# Patient Record
Sex: FEMALE | Race: WHITE
[De-identification: names, ages, dates, MRNs, and addresses within clinical notes are randomized per-mention and may not be internally consistent; named-entity substitution may affect disease eponyms.]

---

## 2017-10-02 ENCOUNTER — HOSPITAL ENCOUNTER (OUTPATIENT)
Dept: HOSPITAL 17 - NEPE | Age: 57
Setting detail: OBSERVATION
LOS: 1 days | Discharge: HOME | End: 2017-10-03
Attending: HOSPITALIST | Admitting: HOSPITALIST
Payer: COMMERCIAL

## 2017-10-02 VITALS
SYSTOLIC BLOOD PRESSURE: 127 MMHG | HEART RATE: 70 BPM | RESPIRATION RATE: 18 BRPM | DIASTOLIC BLOOD PRESSURE: 58 MMHG | OXYGEN SATURATION: 97 %

## 2017-10-02 VITALS — BODY MASS INDEX: 33.87 KG/M2 | HEIGHT: 64 IN | WEIGHT: 198.42 LBS

## 2017-10-02 VITALS
SYSTOLIC BLOOD PRESSURE: 136 MMHG | OXYGEN SATURATION: 99 % | RESPIRATION RATE: 18 BRPM | HEART RATE: 73 BPM | DIASTOLIC BLOOD PRESSURE: 65 MMHG

## 2017-10-02 VITALS
HEART RATE: 73 BPM | TEMPERATURE: 98.1 F | DIASTOLIC BLOOD PRESSURE: 57 MMHG | OXYGEN SATURATION: 97 % | RESPIRATION RATE: 18 BRPM | SYSTOLIC BLOOD PRESSURE: 122 MMHG

## 2017-10-02 VITALS
DIASTOLIC BLOOD PRESSURE: 65 MMHG | TEMPERATURE: 98.8 F | RESPIRATION RATE: 24 BRPM | SYSTOLIC BLOOD PRESSURE: 136 MMHG | OXYGEN SATURATION: 98 % | HEART RATE: 90 BPM

## 2017-10-02 VITALS — DIASTOLIC BLOOD PRESSURE: 62 MMHG | SYSTOLIC BLOOD PRESSURE: 130 MMHG

## 2017-10-02 VITALS — HEART RATE: 75 BPM

## 2017-10-02 VITALS — HEART RATE: 69 BPM

## 2017-10-02 DIAGNOSIS — I47.1: ICD-10-CM

## 2017-10-02 DIAGNOSIS — E66.9: ICD-10-CM

## 2017-10-02 DIAGNOSIS — R00.2: ICD-10-CM

## 2017-10-02 DIAGNOSIS — Z79.899: ICD-10-CM

## 2017-10-02 DIAGNOSIS — Z98.84: ICD-10-CM

## 2017-10-02 DIAGNOSIS — J45.909: ICD-10-CM

## 2017-10-02 DIAGNOSIS — R06.02: ICD-10-CM

## 2017-10-02 DIAGNOSIS — I10: ICD-10-CM

## 2017-10-02 DIAGNOSIS — E03.9: ICD-10-CM

## 2017-10-02 DIAGNOSIS — E11.9: ICD-10-CM

## 2017-10-02 DIAGNOSIS — M19.90: ICD-10-CM

## 2017-10-02 DIAGNOSIS — I48.91: Primary | ICD-10-CM

## 2017-10-02 DIAGNOSIS — R07.89: ICD-10-CM

## 2017-10-02 DIAGNOSIS — K21.9: ICD-10-CM

## 2017-10-02 DIAGNOSIS — G47.33: ICD-10-CM

## 2017-10-02 DIAGNOSIS — E78.00: ICD-10-CM

## 2017-10-02 DIAGNOSIS — R53.1: ICD-10-CM

## 2017-10-02 LAB
ANION GAP SERPL CALC-SCNC: 7 MEQ/L (ref 5–15)
APTT BLD: 23.8 SEC (ref 24.3–30.1)
BASOPHILS # BLD AUTO: 0 TH/MM3 (ref 0–0.2)
BASOPHILS NFR BLD: 0.4 % (ref 0–2)
BUN SERPL-MCNC: 10 MG/DL (ref 7–18)
CHLORIDE SERPL-SCNC: 106 MEQ/L (ref 98–107)
CK SERPL-CCNC: 73 U/L (ref 26–192)
CK SERPL-CCNC: 76 U/L (ref 26–192)
COLOR UR: (no result)
COMMENT (UR): (no result)
CULTURE IF INDICATED: (no result)
EOSINOPHIL # BLD: 0.2 TH/MM3 (ref 0–0.4)
EOSINOPHIL NFR BLD: 1.9 % (ref 0–4)
ERYTHROCYTE [DISTWIDTH] IN BLOOD BY AUTOMATED COUNT: 13.8 % (ref 11.6–17.2)
GFR SERPLBLD BASED ON 1.73 SQ M-ARVRAT: 72 ML/MIN (ref 89–?)
GLUCOSE UR STRIP-MCNC: (no result) MG/DL
HCO3 BLD-SCNC: 27.2 MEQ/L (ref 21–32)
HCT VFR BLD CALC: 40 % (ref 35–46)
HEMO FLAGS: (no result)
HGB UR QL STRIP: (no result)
INR PPP: 0.9 RATIO
KETONES UR STRIP-MCNC: (no result) MG/DL
LYMPHOCYTES # BLD AUTO: 2.3 TH/MM3 (ref 1–4.8)
LYMPHOCYTES NFR BLD AUTO: 24.4 % (ref 9–44)
MAGNESIUM SERPL-MCNC: 2.2 MG/DL (ref 1.5–2.5)
MCH RBC QN AUTO: 29.2 PG (ref 27–34)
MCHC RBC AUTO-ENTMCNC: 34.5 % (ref 32–36)
MCV RBC AUTO: 84.8 FL (ref 80–100)
MONOCYTES NFR BLD: 9.8 % (ref 0–8)
NEUTROPHILS # BLD AUTO: 5.9 TH/MM3 (ref 1.8–7.7)
NEUTROPHILS NFR BLD AUTO: 63.5 % (ref 16–70)
NITRITE UR QL STRIP: (no result)
PLATELET # BLD: 212 TH/MM3 (ref 150–450)
POTASSIUM SERPL-SCNC: 4 MEQ/L (ref 3.5–5.1)
PROTHROMBIN TIME: 10 SEC (ref 9.8–11.6)
RBC # BLD AUTO: 4.72 MIL/MM3 (ref 4–5.3)
SODIUM SERPL-SCNC: 140 MEQ/L (ref 136–145)
SP GR UR STRIP: 1.01 (ref 1–1.03)
SQUAMOUS #/AREA URNS HPF: <1 /HPF (ref 0–5)
WBC # BLD AUTO: 9.2 TH/MM3 (ref 4–11)

## 2017-10-02 PROCEDURE — 94664 DEMO&/EVAL PT USE INHALER: CPT

## 2017-10-02 PROCEDURE — 81001 URINALYSIS AUTO W/SCOPE: CPT

## 2017-10-02 PROCEDURE — 71010: CPT

## 2017-10-02 PROCEDURE — 82550 ASSAY OF CK (CPK): CPT

## 2017-10-02 PROCEDURE — 80048 BASIC METABOLIC PNL TOTAL CA: CPT

## 2017-10-02 PROCEDURE — 96360 HYDRATION IV INFUSION INIT: CPT

## 2017-10-02 PROCEDURE — 93306 TTE W/DOPPLER COMPLETE: CPT

## 2017-10-02 PROCEDURE — 84484 ASSAY OF TROPONIN QUANT: CPT

## 2017-10-02 PROCEDURE — 85730 THROMBOPLASTIN TIME PARTIAL: CPT

## 2017-10-02 PROCEDURE — 84443 ASSAY THYROID STIM HORMONE: CPT

## 2017-10-02 PROCEDURE — 83735 ASSAY OF MAGNESIUM: CPT

## 2017-10-02 PROCEDURE — 85610 PROTHROMBIN TIME: CPT

## 2017-10-02 PROCEDURE — 85025 COMPLETE CBC W/AUTO DIFF WBC: CPT

## 2017-10-02 PROCEDURE — 93005 ELECTROCARDIOGRAM TRACING: CPT

## 2017-10-02 PROCEDURE — 87804 INFLUENZA ASSAY W/OPTIC: CPT

## 2017-10-02 PROCEDURE — 94640 AIRWAY INHALATION TREATMENT: CPT

## 2017-10-02 PROCEDURE — G0378 HOSPITAL OBSERVATION PER HR: HCPCS

## 2017-10-02 RX ADMIN — IPRATROPIUM BROMIDE AND ALBUTEROL SULFATE SCH AMPULE: .5; 3 SOLUTION RESPIRATORY (INHALATION) at 19:56

## 2017-10-02 RX ADMIN — GUAIFENESIN SCH MG: 600 TABLET, EXTENDED RELEASE ORAL at 21:11

## 2017-10-02 NOTE — HHI.HP
HPI


Service


[facility]


Primary Care Physician


Simone Jones MD


Admission Diagnosis


palpitation. sob


Travel History


International Travel<30 Days:  No


Contact w/Intl Traveler <30 Da:  No


Traveled to Known Affected Are:  No


History of Present Illness


Pt is 58 yo with asthma, remote svt, gastric sleeve, sent to ED by pcp for 

tachyarrhythmia


Over this past weekend pt was seen in urgent care for sore throat, acute 

bronchitis and asthma


exacerbation. She has been taking amoxacillin and albuterol nebs since 

yesterday. She took


a breathing treatment around 6am this morning and around 12:30 began with 

palpitations and


chest tightness. She says that chest heaviness/tightness is typical of her 

asthma exacerbations


but hasn't needed the nebulizer for 3 yrs. She tries to avoid the steroid. In 

her pcp office ekg


was concerning for afib/rvr. By the time she arrived here pt was in sinus 

rhythm.





Review of Systems


Other


cp'


sob


palpitation


sore throat





Past Family Social History


Past Medical History


asthma


hx obesity/braden. resolved after 


  gastric sleeve surgery


cholecystomy


hysterectomy


endometriosis surgery.


hypothyroidism


gerd


Reported Medications


Albuterol Neb (Albuterol Sulfate) 2.5 Mg/0.5 Ml Neb 2.5 Mg NEB Q4HR NEB PRN


     Note: The Albuterol Sulfate Inhalation Solution is concentrated and


     must be diluted. Read complete instructions carefully before using.


Amoxicillin 500 Mg Cap Unknown Dose PO BID


Synthroid (Levothyroxine Sodium) 137 Mcg Tab 137 Mcg PO DAILY


Protonix (Pantoprazole Sodium) 20 Mg Tab 20 Mg PO DAILY


Allergies:  


Coded Allergies:  


     codeine (Unverified  Allergy, Severe, rash, 10/2/17)


     meperidine (Unverified  Allergy, Severe, rash, 10/2/17)


     sulfamethoxazole (Unverified  Allergy, Severe, HIVES, ITCHING, 10/2/17)


     trimethoprim (Unverified  Allergy, Severe, HIVES, ITCHING, 10/2/17)


Family History


nc


Social History


no etoh/tob





Physical Exam


Vital Signs


nad


heart reg


lung cta


abd s/nt


ext no edema


Vital Signs








  Date Time  Temp Pulse Resp B/P (MAP) Pulse Ox O2 Delivery O2 Flow Rate FiO2


 


10/2/17 17:32  70 18 127/58 (81) 97 Room Air  


 


10/2/17 17:22  73 18 136/65 (88) 99 Room Air  


 


10/2/17 15:35 98.8 90 24 136/65 (88) 98   








Laboratory





Laboratory Tests








Test


  10/2/17


15:42


 


White Blood Count 9.2 


 


Red Blood Count 4.72 


 


Hemoglobin 13.8 


 


Hematocrit 40.0 


 


Mean Corpuscular Volume 84.8 


 


Mean Corpuscular Hemoglobin 29.2 


 


Mean Corpuscular Hemoglobin


Concent 34.5 


 


 


Red Cell Distribution Width 13.8 


 


Platelet Count 212 


 


Mean Platelet Volume 8.4 


 


Neutrophils (%) (Auto) 63.5 


 


Lymphocytes (%) (Auto) 24.4 


 


Monocytes (%) (Auto) 9.8 


 


Eosinophils (%) (Auto) 1.9 


 


Basophils (%) (Auto) 0.4 


 


Neutrophils # (Auto) 5.9 


 


Lymphocytes # (Auto) 2.3 


 


Monocytes # (Auto) 0.9 


 


Eosinophils # (Auto) 0.2 


 


Basophils # (Auto) 0.0 


 


CBC Comment DIFF FINAL 


 


Differential Comment  


 


Prothrombin Time 10.0 


 


Prothromb Time International


Ratio 0.9 


 


 


Activated Partial


Thromboplast Time 23.8 


 


 


Urine Color LIGHT-YELLOW 


 


Urine Turbidity CLEAR 


 


Urine pH 6.5 


 


Urine Specific Gravity 1.011 


 


Urine Protein NEG 


 


Urine Glucose (UA) NEG 


 


Urine Ketones NEG 


 


Urine Occult Blood NEG 


 


Urine Nitrite NEG 


 


Urine Bilirubin NEG 


 


Urine Urobilinogen LESS THAN 2.0 


 


Urine Leukocyte Esterase NEG 


 


Urine RBC LESS THAN 1 


 


Urine WBC LESS THAN 1 


 


Urine Squamous Epithelial


Cells <1 


 


 


Urine Amorphous Sediment RARE 


 


Microscopic Urinalysis Comment


  CULT NOT


INDICATED


 


Blood Urea Nitrogen 10 


 


Creatinine 0.82 


 


Random Glucose 115 


 


Calcium Level 9.2 


 


Magnesium Level 2.2 


 


Sodium Level 140 


 


Potassium Level 4.0 


 


Chloride Level 106 


 


Carbon Dioxide Level 27.2 


 


Anion Gap 7 


 


Estimat Glomerular Filtration


Rate 72 


 


 


Total Creatine Kinase 76 


 


Troponin I LESS THAN 0.02 


 


Thyroid Stimulating Hormone


3rd Gen 0.826 


 














 Date/Time


Source Procedure


Growth Status


 


 


 10/2/17 15:50


Nasal Washing Influenza Types A,B Antigen (ADONIS) - Final


NEGATIVE FOR FLU A AND B ANTIGEN.... Complete








Result Diagram:  


10/2/17 1542                                                                   

             10/2/17 1542








Caprini VTE Risk Assessment


Caprini VTE Risk Assessment:  No/Low Risk (score <= 1)


Caprini Risk Assessment Model











 Point Value = 1          Point Value = 2  Point Value = 3  Point Value = 5


 


Age 41-60


Minor surgery


BMI > 25 kg/m2


Swollen legs


Varicose veins


Pregnancy or postpartum


History of unexplained or recurrent


   spontaneous 


Oral contraceptives or hormone


   replacement


Sepsis (< 1 month)


Serious lung disease, including


   pneumonia (< 1 month)


Abnormal pulmonary function


Acute myocardial infarction


Congestive heart failure (< 1 month)


History of inflammatory bowel disease


Medical patient at bed rest Age 61-74


Arthroscopic surgery


Major open surgery (> 45 min)


Laparoscopic surgery (> 45 min)


Malignancy


Confined to bed (> 72 hours)


Immobilizing plaster cast


Central venous access Age >= 75


History of VTE


Family history of VTE


Factor V Leiden


Prothrombin 43285M


Lupus anticoagulant


Anticardiolipin antibodies


Elevated serum homocysteine


Heparin-induced thrombocytopenia


Other congenital or acquired


   thrombophilia Stroke (< 1 month)


Elective arthroplasty


Hip, pelvis, or leg fracture


Acute spinal cord injury (< 1 month)








Prophylaxis Regimen











   Total Risk


Factor Score Risk Level Prophylaxis Regimen


 


0-1      Low Early ambulation


 


2 Moderate Order ONE of the following:


*Sequential Compression Device (SCD)


*Heparin 5000 units SQ BID


 


3-4 Higher Order ONE of the following medications:


*Heparin 5000 units SQ TID


*Enoxaparin/Lovenox 40 mg SQ daily (WT < 150 kg, CrCl > 30 mL/min)


*Enoxaparin/Lovenox 30 mg SQ daily (WT < 150 kg, CrCl > 10-29 mL/min)


*Enoxaparin/Lovenox 30 mg SQ BID (WT < 150 kg, CrCl > 30 mL/min)


AND/OR


*Sequential Compression Device (SCD)


 


5 or more Highest Order ONE of the following medications:


*Heparin 5000 units SQ TID (Preferred with Epidurals)


*Enoxaparin/Lovenox 40 mg SQ daily (WT < 150 kg, CrCl > 30 mL/min)


*Enoxaparin/Lovenox 30 mg SQ daily (WT < 150 kg, CrCl > 10-29 mL/min)


*Enoxaparin/Lovenox 30 mg SQ BID (WT < 150 kg, CrCl > 30 mL/min)


AND


*Sequential Compression Device (SCD)











Assessment and Plan


Problem List:  


(1) Atrial fibrillation


ICD Codes:  I48.91 - Unspecified atrial fibrillation


Status:  Acute


Plan:  afib/rvr...currently sinus


asthma with bronchitis





Pt apparently had about 2 hrs of afib/rvr. now nsr.


It may have been related to her acute respiratory illness


She had some chest tightness which is probably related


  to the rapid afib and asthma...currently resolved.





monitor on telemetry. 


check ce again


echo


resume her duonebs and abx. if no better could give dose


  of solumedrol.


Pt has low chadsvasc score but says she really wouldn't consider


  any type of anticoagulation at this point anyway....unless it occurred


  again


will observe her overnight and d/c tomorrow if she remains stable.





(2) Asthma


ICD Codes:  J45.909 - Unspecified asthma, uncomplicated


Status:  Acute





Problem Qualifiers





(1) Atrial fibrillation:  


Qualified Codes:  I48.91 - Unspecified atrial fibrillation








Maldonado Harris MD Oct 2, 2017 19:52

## 2017-10-02 NOTE — PD
HPI


Chief Complaint:  Chest Pain


Time Seen by Provider:  15:29


Travel History


International Travel<30 days:  No


Contact w/Intl Traveler<30days:  No


Traveled to known affect area:  No





History of Present Illness


HPI


57-year-old female that presents to the ED for evolution of possible A. fib.  

Patient was seen yesterday in urgent care and was diagnosed with bronchitis.  

She was started on steroids, albuterol inhaler and amoxicillin.  Per patient she

's been compliant with the medications except for the prednisone which she has 

not taken.  She last used her inhaler around 6:00 this morning.  Per patient 

today she woke up feeling very weak and tired.  She felt like her heart was 

palpitating.  She did not take anymore of the inhaler because of this.  She 

went to the urgent care in the and EKG and found that she was having atrial 

fibrillation with RVR.  Patient was not given any medications but brought here 

via ambulance for evaluation of this.  Per patient she feels chest pressure.  

Per patient pain is more like a pressure and is 4 out of 10.  Per patient he 

does not radiate.  Per patient she feels very anxious.  She denies any fevers 

chills or sweats but she does state having some congestion and cough which is 

what made her go to the urgent care yesterday.  She's had a BUN inhaler in the 

past with no issues.  She does tell me that she had had atrial fibrillation in 

the past.  Per ambulance report she kept going on and off from A. fib.  

Currently she appears to be in sinus rhythm but ambulance that brought at EKG 

done at the facility which showed she was in A. fib and RVR with a heart rate 

of 140s.  She does have multiple allergies to medication including antibiotics 

denies any history of heart disease or MI.





PFSH


Past Medical History


Arthritis:  Yes


Asthma:  Yes


Autoimmune Disease:  No


Anxiety:  No


Depression:  No


Heart Rhythm Problems:  Yes


Cancer:  Yes (cervical)


Cardiac Catheterization:  No


Cardiovascular Problems:  Yes (SVT)


High Cholesterol:  Yes


Chemotherapy:  No


Chest Pain:  Yes


Congestive Heart Failure:  No


COPD:  No


Cerebrovascular Accident:  No


Diabetes:  Yes (diet controlled)


Patient Takes Glucophage:  No


Diminished Hearing:  No


Endocrine:  Yes


Gastrointestinal Disorders:  Yes


GERD:  Yes


Genitourinary:  No


Headaches:  No


Hepatitis:  No


Hiatal Hernia:  Yes


Hypertension:  Yes


Immune Disorder:  No


Implanted Vascular Access Dvce:  No


Kidney Stones:  No


Musculoskeletal:  Yes (spurs lower back  bursitis knees)


Neurologic:  No


Psychiatric:  No


Reproductive:  Yes (endometriosis)


Respiratory:  Yes (sleep apnea  asthma)


Immunizations Current:  Yes


Migraines:  No


Pneumonia:  Yes


Radiation Therapy:  No


Renal Failure:  No


Seizures:  No


Sickle Cell Disease:  No


Sleep Apnea:  Yes


Thyroid Disease:  Yes


Ulcer:  No





Past Surgical History


Abdominal Surgery:  Yes (lap yogesh)


AICD:  No


Cardiac Surgery:  Yes (HEART CATH)


 Section:  Yes


Cholecystectomy:  Yes


Coronary Artery Bypass Graft:  No


Ear Surgery:  No


Endocrine Surgery:  No


Eye Surgery:  No


Genitourinary Surgery:  No


Gynecologic Surgery:  Yes (x2 c-sect ion  hysterectomy  x3 surgeries for 

endometriosis)


Hysterectomy:  Yes


Joint Replacement:  No


Neurologic Surgery:  No


Oral Surgery:  No


Pacemaker:  No


Thoracic Surgery:  No


Other Surgery:  Yes





Family History


Family Myocardial Infarction:  Yes





Social History


Alcohol Use:  No


Tobacco Use:  No


Substance Use:  No





Allergies-Medications


(Allergen,Severity, Reaction):  


Coded Allergies:  


     codeine (Unverified  Allergy, Severe, rash, 10/2/17)


     meperidine (Unverified  Allergy, Severe, rash, 10/2/17)


     sulfamethoxazole (Unverified  Allergy, Severe, HIVES, ITCHING, 10/2/17)


     trimethoprim (Unverified  Allergy, Severe, HIVES, ITCHING, 10/2/17)


Reported Meds & Prescriptions





Reported Meds & Active Scripts


Active


Reported


Albuterol Neb (Albuterol Sulfate) 2.5 Mg/0.5 Ml Neb 2.5 Mg NEB Q4HR NEB PRN


     Note: The Albuterol Sulfate Inhalation Solution is concentrated and


     must be diluted. Read complete instructions carefully before using.


Amoxicillin 500 Mg Cap Unknown Dose PO BID


Synthroid (Levothyroxine Sodium) 137 Mcg Tab 137 Mcg PO DAILY


Protonix (Pantoprazole Sodium) 20 Mg Tab 20 Mg PO DAILY








Review of Systems


Except as stated in HPI:  all other systems reviewed are Neg





Physical Exam


Narrative


GENERAL: 


SKIN: Warm and dry.


HEAD: Atraumatic. Normocephalic. 


EYES: Pupils equal and round. No scleral icterus. No injection or drainage. 


ENT: No nasal bleeding or discharge.  Mucous membranes pink and moist.  Tongue 

is midline.  No uvula deviation.


NECK: Trachea midline. No JVD. 


CARDIOVASCULAR: Regular rate and rhythm.  No murmurs, S3, S4.


RESPIRATORY: No accessory muscle use. Clear to auscultation. Breath sounds 

equal bilaterally. 


GASTROINTESTINAL: Abdomen soft, non-tender, nondistended. Hepatic and splenic 

margins not palpable. 


MUSCULOSKELETAL: Extremities without clubbing, cyanosis, or edema. No obvious 

deformities.  Full range of motion of the upper and lower extremities 

bilaterally.  2+ pulses bilaterally.


NEUROLOGICAL: Awake and alert. No obvious cranial nerve deficits.  Motor 

grossly within normal limits. Five out of 5 muscle strength in the arms and 

legs.  Normal speech.


PSYCHIATRIC: Appropriate mood and affect; insight and judgment normal.





Data


Data


Last Documented VS





Vital Signs








  Date Time  Temp Pulse Resp B/P (MAP) Pulse Ox O2 Delivery O2 Flow Rate FiO2


 


10/2/17 17:32  70 18 127/58 (81) 97 Room Air  


 


10/2/17 15:35 98.8       








Orders





 Orders


Electrocardiogram (10/2/17 15:37)


Complete Blood Count With Diff (10/2/17 15:37)


Basic Metabolic Panel (Bmp) (10/2/17 15:37)


Ckmb (Isoenzyme) Profile (10/2/17 15:37)


Troponin I (10/2/17 15:37)


Prothrombin Time / Inr (Pt) (10/2/17 15:37)


Act Partial Throm Time (Ptt) (10/2/17 15:37)


Urinalysis - C+S If Indicated (10/2/17 15:37)


Magnesium (Mg) (10/2/17 15:37)


Thyroid Stimulating Hormone (10/2/17 15:37)


Chest, Single Ap (10/2/17 15:37)


Iv Access Insert/Monitor (10/2/17 15:37)


Ecg Monitoring (10/2/17 15:37)


Oximetry (10/2/17 15:37)


Diltiazem Inj (Cardizem Inj) (10/2/17 15:45)


Sodium Chlor 0.9% 1000 Ml Inj (Ns 1000 M (10/2/17 15:42)


Influenzae A/B Antigen (10/2/17 15:43)


Admit Order (Ed Use Only) (10/2/17 18:04)





Labs





Laboratory Tests








Test


  10/2/17


15:42


 


White Blood Count 9.2 TH/MM3 


 


Red Blood Count 4.72 MIL/MM3 


 


Hemoglobin 13.8 GM/DL 


 


Hematocrit 40.0 % 


 


Mean Corpuscular Volume 84.8 FL 


 


Mean Corpuscular Hemoglobin 29.2 PG 


 


Mean Corpuscular Hemoglobin


Concent 34.5 % 


 


 


Red Cell Distribution Width 13.8 % 


 


Platelet Count 212 TH/MM3 


 


Mean Platelet Volume 8.4 FL 


 


Neutrophils (%) (Auto) 63.5 % 


 


Lymphocytes (%) (Auto) 24.4 % 


 


Monocytes (%) (Auto) 9.8 % 


 


Eosinophils (%) (Auto) 1.9 % 


 


Basophils (%) (Auto) 0.4 % 


 


Neutrophils # (Auto) 5.9 TH/MM3 


 


Lymphocytes # (Auto) 2.3 TH/MM3 


 


Monocytes # (Auto) 0.9 TH/MM3 


 


Eosinophils # (Auto) 0.2 TH/MM3 


 


Basophils # (Auto) 0.0 TH/MM3 


 


CBC Comment DIFF FINAL 


 


Differential Comment  


 


Prothrombin Time 10.0 SEC 


 


Prothromb Time International


Ratio 0.9 RATIO 


 


 


Activated Partial


Thromboplast Time 23.8 SEC 


 


 


Urine Color LIGHT-YELLOW 


 


Urine Turbidity CLEAR 


 


Urine pH 6.5 


 


Urine Specific Gravity 1.011 


 


Urine Protein NEG mg/dL 


 


Urine Glucose (UA) NEG mg/dL 


 


Urine Ketones NEG mg/dL 


 


Urine Occult Blood NEG 


 


Urine Nitrite NEG 


 


Urine Bilirubin NEG 


 


Urine Urobilinogen


  LESS THAN 2.0


MG/DL


 


Urine Leukocyte Esterase NEG 


 


Urine RBC


  LESS THAN 1


/hpf


 


Urine WBC


  LESS THAN 1


/hpf


 


Urine Squamous Epithelial


Cells <1 /hpf 


 


 


Urine Amorphous Sediment RARE 


 


Microscopic Urinalysis Comment


  CULT NOT


INDICATED


 


Blood Urea Nitrogen 10 MG/DL 


 


Creatinine 0.82 MG/DL 


 


Random Glucose 115 MG/DL 


 


Calcium Level 9.2 MG/DL 


 


Magnesium Level 2.2 MG/DL 


 


Sodium Level 140 MEQ/L 


 


Potassium Level 4.0 MEQ/L 


 


Chloride Level 106 MEQ/L 


 


Carbon Dioxide Level 27.2 MEQ/L 


 


Anion Gap 7 MEQ/L 


 


Estimat Glomerular Filtration


Rate 72 ML/MIN 


 


 


Total Creatine Kinase 76 U/L 


 


Troponin I


  LESS THAN 0.02


NG/ML


 


Thyroid Stimulating Hormone


3rd Gen 0.826 uIU/ML 


 











MDM


Medical Decision Making


Medical Screen Exam Complete:  Yes


Emergency Medical Condition:  Yes


Medical Record Reviewed:  Yes


Interpretation(s)


CBC & BMP Diagram


10/2/17 15:42








Calcium Level 9.2, Magnesium Level 2.2





troponin and CKMB negative


EKG here showed sinus rythm read by me and attending. EKG done outpatient 

showed a. fib RVR with HR in 140s.





CXR negative


Differential Diagnosis


Chest pain versus ACS versus atrial fibrillation versus arrhythmia versus 

congestion versus pneumonia


Narrative Course


57-year-old female that presents to the ED for evaluation of chest pain.  

Patient was properly examined and was found to have signs and symptoms 

consistent with chest discomfort.  Unclear etiology at this time.  Patient did 

have bouts of A. fib documented by ambulance as well as previous urgent care.  

Here her EKG shows no sign of A. fib and RVR.  Labs and imaging were started.  

Patient was given IV fluids.  Labs and imaging showed no sign of acute disease.

  Patient has been normal at this time.  Both EKGs were reviewed by my 

attending.  She recommends admission for further evaluation.  Because of the A. 

fib she recommends admission to medicine instead of the chest pain center she 

continues to have chest pain.  Dr. Perez was made aware of this and 

agrees with admission.





Procedures


EKG Prior to Arrival:  Yes





Diagnosis





 Primary Impression:  


 Chest pain in adult


 Additional Impression:  


 Atrial fibrillation


 Qualified Codes:  I48.91 - Unspecified atrial fibrillation





Admitting Information


Admitting Physician Requests:  Observation











Miguel Villarreal Oct 2, 2017 16:23

## 2017-10-02 NOTE — RADRPT
EXAM DATE/TIME:  10/02/2017 15:42 

 

[facility] COMPARISON:     

No previous studies available for comparison.

 

                     

INDICATIONS :     

Chest pain.

                     

 

MEDICAL HISTORY :     

Hypertension.          

 

SURGICAL HISTORY :     

None.   

 

ENCOUNTER:     

Initial                                        

 

ACUITY:     

1 day      

 

PAIN SCORE:     

10/10

 

LOCATION:     

Bilateral chest 

 

FINDINGS:     

A single view of the chest demonstrates the lungs to be symmetrically aerated without evidence of mas
s, infiltrate or effusion.  The cardiomediastinal contours are unremarkable.  Osseous structures are 
intact.

 

CONCLUSION:     

1. No acute cardiopulmonary disease.

 

 

 

 Brandon Hook MD on October 02, 2017 at 15:58           

Board Certified Radiologist.

 This report was verified electronically.

## 2017-10-03 VITALS — HEART RATE: 76 BPM

## 2017-10-03 VITALS
SYSTOLIC BLOOD PRESSURE: 110 MMHG | HEART RATE: 66 BPM | TEMPERATURE: 97.8 F | OXYGEN SATURATION: 98 % | DIASTOLIC BLOOD PRESSURE: 56 MMHG | RESPIRATION RATE: 17 BRPM

## 2017-10-03 VITALS
TEMPERATURE: 98.2 F | OXYGEN SATURATION: 97 % | RESPIRATION RATE: 18 BRPM | HEART RATE: 69 BPM | SYSTOLIC BLOOD PRESSURE: 120 MMHG | DIASTOLIC BLOOD PRESSURE: 57 MMHG

## 2017-10-03 VITALS
TEMPERATURE: 97.8 F | HEART RATE: 76 BPM | SYSTOLIC BLOOD PRESSURE: 133 MMHG | OXYGEN SATURATION: 100 % | RESPIRATION RATE: 16 BRPM | DIASTOLIC BLOOD PRESSURE: 63 MMHG

## 2017-10-03 VITALS — HEART RATE: 96 BPM

## 2017-10-03 VITALS
DIASTOLIC BLOOD PRESSURE: 55 MMHG | TEMPERATURE: 98 F | RESPIRATION RATE: 16 BRPM | SYSTOLIC BLOOD PRESSURE: 116 MMHG | HEART RATE: 72 BPM | OXYGEN SATURATION: 98 %

## 2017-10-03 RX ADMIN — IPRATROPIUM BROMIDE AND ALBUTEROL SULFATE SCH AMPULE: .5; 3 SOLUTION RESPIRATORY (INHALATION) at 15:24

## 2017-10-03 RX ADMIN — IPRATROPIUM BROMIDE AND ALBUTEROL SULFATE SCH AMPULE: .5; 3 SOLUTION RESPIRATORY (INHALATION) at 11:18

## 2017-10-03 RX ADMIN — IPRATROPIUM BROMIDE AND ALBUTEROL SULFATE SCH AMPULE: .5; 3 SOLUTION RESPIRATORY (INHALATION) at 07:52

## 2017-10-03 RX ADMIN — GUAIFENESIN SCH MG: 600 TABLET, EXTENDED RELEASE ORAL at 07:28

## 2017-10-03 NOTE — EKG
Date Performed: 10/02/2017       Time Performed: 15:41:59

 

PTAGE:      57 years

 

EKG:      Sinus rhythm 

 

 BORDERLINE LEFT AXIS DEVIATION BORDERLINE ECG Compared to prior tracing no significant change 

 

 PREVIOUS TRACING            : 08/31/2011 01.23

 

DOCTOR:   Kaylie Zapien  Interpretating Date/Time  10/03/2017 08:12:37

## 2017-10-03 NOTE — HHI.PR
Subjective


Remarks


shortness of breath has improved, no palpitations over night


no new concerns





Objective


Vitals





Vital Signs








  Date Time  Temp Pulse Resp B/P (MAP) Pulse Ox O2 Delivery O2 Flow Rate FiO2


 


10/3/17 11:45 97.8 76 16 133/63 (86) 100   


 


10/3/17 08:12  76      


 


10/3/17 07:55 98.0 72 16 116/55 (75) 98   


 


10/3/17 05:05        21


 


10/3/17 04:05 98.2 69 18 120/57 (78) 97   


 


10/3/17 00:03 97.8 66 17 110/56 (74) 98   


 


10/2/17 23:00  69      


 


10/2/17 21:27  75      


 


10/2/17 21:00 98.1 73 18 122/57 (78) 97   


 


10/2/17 20:46    130/62 (84) 98   


 


10/2/17 17:32  70 18 127/58 (81) 97 Room Air  


 


10/2/17 17:22  73 18 136/65 (88) 99 Room Air  


 


10/2/17 15:35 98.8 90 24 136/65 (88) 98   








Result Diagram:  


10/2/17 1542                                                                   

             10/2/17 1542





Other Results





 Laboratory Tests








Test


  10/2/17


15:42 10/2/17


20:05


 


White Blood Count 9.2 TH/MM3  


 


Red Blood Count 4.72 MIL/MM3  


 


Hemoglobin 13.8 GM/DL  


 


Hematocrit 40.0 %  


 


Mean Corpuscular Volume 84.8 FL  


 


Mean Corpuscular Hemoglobin 29.2 PG  


 


Mean Corpuscular Hemoglobin


Concent 34.5 % 


  


 


 


Red Cell Distribution Width 13.8 %  


 


Platelet Count 212 TH/MM3  


 


Mean Platelet Volume 8.4 FL  


 


Neutrophils (%) (Auto) 63.5 %  


 


Lymphocytes (%) (Auto) 24.4 %  


 


Monocytes (%) (Auto) 9.8 %  


 


Eosinophils (%) (Auto) 1.9 %  


 


Basophils (%) (Auto) 0.4 %  


 


Neutrophils # (Auto) 5.9 TH/MM3  


 


Lymphocytes # (Auto) 2.3 TH/MM3  


 


Monocytes # (Auto) 0.9 TH/MM3  


 


Eosinophils # (Auto) 0.2 TH/MM3  


 


Basophils # (Auto) 0.0 TH/MM3  


 


CBC Comment DIFF FINAL  


 


Differential Comment   


 


Prothrombin Time 10.0 SEC  


 


Prothromb Time International


Ratio 0.9 RATIO 


  


 


 


Activated Partial


Thromboplast Time 23.8 SEC 


  


 


 


Urine Color LIGHT-YELLOW  


 


Urine Turbidity CLEAR  


 


Urine pH 6.5  


 


Urine Specific Gravity 1.011  


 


Urine Protein NEG mg/dL  


 


Urine Glucose (UA) NEG mg/dL  


 


Urine Ketones NEG mg/dL  


 


Urine Occult Blood NEG  


 


Urine Nitrite NEG  


 


Urine Bilirubin NEG  


 


Urine Urobilinogen


  LESS THAN 2.0


MG/DL 


 


 


Urine Leukocyte Esterase NEG  


 


Urine RBC


  LESS THAN 1


/hpf 


 


 


Urine WBC


  LESS THAN 1


/hpf 


 


 


Urine Squamous Epithelial


Cells <1 /hpf 


  


 


 


Urine Amorphous Sediment RARE  


 


Microscopic Urinalysis Comment


  CULT NOT


INDICATED 


 


 


Blood Urea Nitrogen 10 MG/DL  


 


Creatinine 0.82 MG/DL  


 


Random Glucose 115 MG/DL  


 


Calcium Level 9.2 MG/DL  


 


Magnesium Level 2.2 MG/DL  


 


Sodium Level 140 MEQ/L  


 


Potassium Level 4.0 MEQ/L  


 


Chloride Level 106 MEQ/L  


 


Carbon Dioxide Level 27.2 MEQ/L  


 


Anion Gap 7 MEQ/L  


 


Estimat Glomerular Filtration


Rate 72 ML/MIN 


  


 


 


Total Creatine Kinase 76 U/L  73 U/L 


 


Troponin I


  LESS THAN 0.02


NG/ML 


 


 


Thyroid Stimulating Hormone


3rd Gen 0.826 uIU/ML 


  


 








Imaging





Last Impressions








Chest X-Ray 10/2/17 1537 Signed





Impressions: 





 Service Date/Time:  Monday, October 2, 2017 15:42 - CONCLUSION:  1. No acute 





 cardiopulmonary disease.     Brandon Hook MD 








Objective Remarks


nad


heart reg


lung cta


abd s/nt


ext no edema





A/P


Problem List:  


(1) Atrial fibrillation


ICD Codes:  I48.91 - Unspecified atrial fibrillation


Status:  Acute


Plan:  afib/rvr...currently sinus


asthma with bronchitis





Pt apparently had about 2 hrs of afib/rvr. now nsr.


It may have been related to her acute respiratory illness


She had some chest tightness which is probably related


  to the rapid afib and asthma...currently resolved.





monitor on telemetry - telemetry monitor did not reveal Afib/Flutter


echo- pending


resume her duonebs and abx. 


Pt has low chadsvasc score but says she really wouldn't consider


  any type of anticoagulation at this point anyway....unless it occurred


  again.  recommend aspirin EC 81 mg daily


DC home in stable condition on a regular diet as tolerated.  Patient to be DC 

after echo completed.  30- Days Holter monitor at [facility] cardiology and instructed 

to follow with PCP Dr. Jones


Prescriptions for Aspirin EC 81 mg daily, Levaquin 500 mg PO daily x 5 days and 

Claritin 10 mg PO OTC x 5 days





(2) Asthma


ICD Codes:  J45.909 - Unspecified asthma, uncomplicated


Status:  Acute


Assessment and Plan


Patient examined.


Assessment and plan formulated with Pamella Millard PA-C.


I agree with the above.


pt appeared to have a brief period of afib yesterday before arriving at the


hospital. she was in sinus when arriving here. could be related to her


acute respiratory illness. echo pending.  called cp to arrange 30day event 


monitor. d/c today. fu dr Jones. discussed with him. pt chadsvasc score


low. she would not be interested in anticoagulation at this time anyway.


no indication for bb or ccb today...but if reoccurs then will likely prescribe.





Problem Qualifiers





(1) Atrial fibrillation:  


Qualified Codes:  I48.91 - Unspecified atrial fibrillation








Pamella Millard Oct 3, 2017 15:13


Maldonado Harris MD Oct 3, 2017 15:51

## 2017-10-03 NOTE — ECHRPT
Indication:   ATRIAL FIB/FLUTTER

 

 CONCLUSIONS

 The left ventricular systolic function is normal with an estimated ejection fraction in the range of
 60-65%. 

 There is trace tricuspid valve regurgitation. 

 

 BP:  136   / 65      HR:                          Rhythm:           Atrial fibrillation, Atrial flut
ter

 

 MEASUREMENTS  (Male / Female) Normal Values       Technical Quality:Fair

 2D ECHO

 LV Diastolic Diameter PLAX        4.1 cm                4.2 - 5.9 / 3.9 - 5.3 cm

 LV Systolic Diameter PLAX         2.9 cm                

 IVS Diastolic Thickness           0.9 cm                0.6 - 1.0 / 0.6 - 0.9 cm

 LVPW Diastolic Thickness          0.9 cm                0.6 - 1.0 / 0.6 - 0.9 cm

 LV Relative Wall Thickness        0.4                   

 LVOT Diameter                     2.0 cm                

 Aortic Root Diameter              2.8 cm                

 LA Systolic Diameter LX           3.4 cm                3.0 - 4.0 / 2.7 - 3.8 cm

 

 M-MODE

 AV Cusp Separation MM             2.0 cm                

 

 DOPPLER

 AV Peak Velocity                  167.0 cm/s            

 AV Peak Gradient                  11.2 mmHg             

 AV Mean Gradient                  5.0 mmHg              

 AV Velocity Time Integral         27.6 cm               

 LVOT Peak Velocity                126.0 cm/s            

 LVOT Peak Gradient                6.4 mmHg              

 LVOT Velocity Time Integral       25.5 cm               

 AV Area Cont Eq vti               2.9 cm               

 AV Area Cont Eq pk                2.4 cm               

 Mitral E Point Velocity           77.5 cm/s             

 Mitral A Point Velocity           62.7 cm/s             

 Mitral E to A Ratio               1.2                   

 LV E' Lateral Velocity            10.4 cm/s             

 Mitral E to LV E' Lateral Ratio   7.5                   

 LV E' Septal Velocity             7.7 cm/s              

 Mitral E to LV E' Septal Ratio    10.1                  

 TR Peak Velocity                  262.0 cm/s            

 TR Peak Gradient                  27.5 mmHg             

 Right Atrial Pressure             10.0 mmHg             

 Pulmonary Artery Systolic Pressu  37.5 mmHg             

 Right Ventricular Systolic Press  37.5 mmHg             

 PV Peak Velocity                  94.0 cm/s             

 PV Peak Gradient                  3.5 mmHg              

 

 

 FINDINGS

 

 LEFT VENTRICLE

 Normal left ventricular size. 

 Wall thickness is normal. 

 The left ventricular systolic function is normal with an estimated ejection fraction in the range of
 60-65%. 

 

 RIGHT VENTRICLE

 The right ventricular size is normal. 

 The right ventricular systoilc function is normal. 

 

 LEFT ATRIUM

 The left atrial size is normal. 

 

 RIGHT ATRIUM

 The right atrial size is normal. 

 

 ATRIAL SEPTUM

 The interatrial septum not well visualized. 

 

 AORTA

 The aortic root and proximal ascending aorta are normal in size on limited imaging. 

 

 MITRAL VALVE

 Structurally normal mitral valve. 

 No mitral valve regurgitation. 

 No mitral valve stenosis. 

 

 AORTIC VALVE

 Trileaflet aortic valve. No aortic valve stenosis or regurgitation. 

 

 TRICUSPID VALVE

 There is trace tricuspid valve regurgitation. 

 Normal estimated pulmonary pressures. 

 Structurally normal tricuspid valve. 

 

 PULMONARY VALVE

 The pulmonary valve is not well visualized. 

 No pulmonary valve regurgitation. 

 

 VESSELS

 The inferior vena cava (IVC) is normal in size. 

 There is greater than 50% respiratory change in dimension of the inferior vena cava (normal). 

 

 PERICARDIUM

 No pericardial effusion. 

 

 

 

 

  Darwin Cristina DO

  (Electronically Signed)

  Final Date:03 October 2017 17:51

## 2018-02-23 ENCOUNTER — HOSPITAL ENCOUNTER (EMERGENCY)
Dept: HOSPITAL 17 - PHED | Age: 58
Discharge: HOME | End: 2018-02-23
Payer: COMMERCIAL

## 2018-02-23 VITALS
HEART RATE: 87 BPM | RESPIRATION RATE: 16 BRPM | DIASTOLIC BLOOD PRESSURE: 77 MMHG | OXYGEN SATURATION: 99 % | SYSTOLIC BLOOD PRESSURE: 128 MMHG

## 2018-02-23 VITALS — WEIGHT: 203.93 LBS | HEIGHT: 64 IN | BODY MASS INDEX: 34.82 KG/M2

## 2018-02-23 VITALS
OXYGEN SATURATION: 99 % | RESPIRATION RATE: 16 BRPM | SYSTOLIC BLOOD PRESSURE: 142 MMHG | HEART RATE: 78 BPM | DIASTOLIC BLOOD PRESSURE: 67 MMHG

## 2018-02-23 VITALS
TEMPERATURE: 98.6 F | RESPIRATION RATE: 18 BRPM | DIASTOLIC BLOOD PRESSURE: 85 MMHG | OXYGEN SATURATION: 99 % | SYSTOLIC BLOOD PRESSURE: 153 MMHG | HEART RATE: 122 BPM

## 2018-02-23 VITALS — SYSTOLIC BLOOD PRESSURE: 123 MMHG | DIASTOLIC BLOOD PRESSURE: 61 MMHG

## 2018-02-23 DIAGNOSIS — K21.9: ICD-10-CM

## 2018-02-23 DIAGNOSIS — E78.00: ICD-10-CM

## 2018-02-23 DIAGNOSIS — E07.9: ICD-10-CM

## 2018-02-23 DIAGNOSIS — M19.90: ICD-10-CM

## 2018-02-23 DIAGNOSIS — J45.909: ICD-10-CM

## 2018-02-23 DIAGNOSIS — R06.02: ICD-10-CM

## 2018-02-23 DIAGNOSIS — I48.0: Primary | ICD-10-CM

## 2018-02-23 DIAGNOSIS — E11.9: ICD-10-CM

## 2018-02-23 DIAGNOSIS — I10: ICD-10-CM

## 2018-02-23 LAB
ALBUMIN SERPL-MCNC: 3.6 GM/DL (ref 3.4–5)
ALP SERPL-CCNC: 78 U/L (ref 45–117)
ALT SERPL-CCNC: 28 U/L (ref 10–53)
AST SERPL-CCNC: 18 U/L (ref 15–37)
BASOPHILS # BLD AUTO: 0.1 TH/MM3 (ref 0–0.2)
BASOPHILS NFR BLD: 0.8 % (ref 0–2)
BILIRUB SERPL-MCNC: 0.2 MG/DL (ref 0.2–1)
BUN SERPL-MCNC: 20 MG/DL (ref 7–18)
CALCIUM SERPL-MCNC: 8.6 MG/DL (ref 8.5–10.1)
CHLORIDE SERPL-SCNC: 108 MEQ/L (ref 98–107)
CREAT SERPL-MCNC: 0.75 MG/DL (ref 0.5–1)
EOSINOPHIL # BLD: 0.2 TH/MM3 (ref 0–0.4)
EOSINOPHIL NFR BLD: 1.6 % (ref 0–4)
ERYTHROCYTE [DISTWIDTH] IN BLOOD BY AUTOMATED COUNT: 14.7 % (ref 11.6–17.2)
GFR SERPLBLD BASED ON 1.73 SQ M-ARVRAT: 80 ML/MIN (ref 89–?)
GLUCOSE SERPL-MCNC: 148 MG/DL (ref 74–106)
HCO3 BLD-SCNC: 23.8 MEQ/L (ref 21–32)
HCT VFR BLD CALC: 34.2 % (ref 35–46)
HGB BLD-MCNC: 10.8 GM/DL (ref 11.6–15.3)
LYMPHOCYTES # BLD AUTO: 3.3 TH/MM3 (ref 1–4.8)
LYMPHOCYTES NFR BLD AUTO: 34.7 % (ref 9–44)
MCH RBC QN AUTO: 22.5 PG (ref 27–34)
MCHC RBC AUTO-ENTMCNC: 31.5 % (ref 32–36)
MCV RBC AUTO: 71.2 FL (ref 80–100)
MONOCYTE #: 0.7 TH/MM3 (ref 0–0.9)
MONOCYTES NFR BLD: 6.9 % (ref 0–8)
NEUTROPHILS # BLD AUTO: 5.3 TH/MM3 (ref 1.8–7.7)
NEUTROPHILS NFR BLD AUTO: 56 % (ref 16–70)
OVALOCYTES BLD QL SMEAR: (no result)
PLATELET # BLD: 284 TH/MM3 (ref 150–450)
PMV BLD AUTO: 9 FL (ref 7–11)
PROT SERPL-MCNC: 7.3 GM/DL (ref 6.4–8.2)
RBC # BLD AUTO: 4.81 MIL/MM3 (ref 4–5.3)
SODIUM SERPL-SCNC: 140 MEQ/L (ref 136–145)
TROPONIN I SERPL-MCNC: (no result) NG/ML (ref 0.02–0.05)
WBC # BLD AUTO: 9.6 TH/MM3 (ref 4–11)

## 2018-02-23 PROCEDURE — 85025 COMPLETE CBC W/AUTO DIFF WBC: CPT

## 2018-02-23 PROCEDURE — 93005 ELECTROCARDIOGRAM TRACING: CPT

## 2018-02-23 PROCEDURE — 83880 ASSAY OF NATRIURETIC PEPTIDE: CPT

## 2018-02-23 PROCEDURE — 84484 ASSAY OF TROPONIN QUANT: CPT

## 2018-02-23 PROCEDURE — 99284 EMERGENCY DEPT VISIT MOD MDM: CPT

## 2018-02-23 PROCEDURE — 80053 COMPREHEN METABOLIC PANEL: CPT

## 2018-02-23 NOTE — EKG
Date Performed: 02/23/2018       Time Performed: 20:50:26

 

PTAGE:      57 years

 

EKG:      Sinus rhythm 

 

 INCOMPLETE RIGHT BUNDLE BRANCH BLOCK BORDERLINE ECG 

 

NO PREVIOUS TRACING            

 

DOCTOR:   Joe Matthews  Interpretating Date/Time  02/23/2018 22:56:57

## 2018-02-23 NOTE — PD
HPI


Chief Complaint:  Cardiac Complaint


Time Seen by Provider:  20:38


Travel History


International Travel<30 days:  No


Contact w/Intl Traveler<30days:  No


Traveled to known affect area:  No





History of Present Illness


HPI


The patient is a 57-year-old female with no history of heart disease who 

complains of heart palpitations, dizziness, shortness of breath beginning at 

approximately 8 PM today.  She states she knew it was atrial fibrillation, she 

has had it once before.  She felt exactly the same way before.  In October, the 

last time she had this atrial fibrillation, it lasted about 2 hours.  She 

states her doctors did not put her on any medication to prevent atrial 

fibrillation or to slow the rate or anticoagulants, this was just a two-hour 

episode apparently in the held off of medications at that time.  She denies any 

near syncopal spell.  She drinks one half cup of coffee a day and does not 

drink tea but does take an over-the-counter medication called Thrive which she 

states has caffeine in it.  She states she missed this morning's dose.  It is 

administered by a patch on her arm.





PFSH


Past Medical History


Arthritis:  Yes


Asthma:  Yes


Atrial Fibrillation:  Yes


Autoimmune Disease:  No


Blood Disorders:  No


Anxiety:  No


Depression:  No


Heart Rhythm Problems:  Yes


Cancer:  Yes (cervical)


Cardiac Catheterization:  No


Cardiovascular Problems:  Yes (SVT)


High Cholesterol:  Yes


Chemotherapy:  No


Chest Pain:  Yes


Congestive Heart Failure:  No


COPD:  No


Cerebrovascular Accident:  No


Diabetes:  Yes (diet controlled)


Patient Takes Glucophage:  No


Diminished Hearing:  No


Endocrine:  Yes


Gastrointestinal Disorders:  Yes


GERD:  Yes


Genitourinary:  No


Headaches:  No


Hepatitis:  No


Hiatal Hernia:  Yes


Heparin Induced Thrombocytopen:  No


Hypertension:  Yes


Immune Disorder:  No


Implanted Vascular Access Dvce:  No


Kidney Stones:  No


Musculoskeletal:  Yes (spurs lower back  bursitis knees)


Neurologic:  No


Psychiatric:  No


Reproductive:  Yes (endometriosis)


Respiratory:  Yes (sleep apnea  asthma)


Immunizations Current:  Yes


Migraines:  No


Pneumonia:  Yes


Radiation Therapy:  No


Renal Failure:  No


Seizures:  No


Sickle Cell Disease:  No


Sleep Apnea:  Yes


Thyroid Disease:  Yes


Ulcer:  No


Pregnant?:  Not Pregnant





Past Surgical History


Abdominal Surgery:  Yes (lap yogesh)


AICD:  No


Cardiac Surgery:  Yes (HEART CATH)


 Section:  Yes


Cholecystectomy:  Yes


Coronary Artery Bypass Graft:  No


Ear Surgery:  No


Endocrine Surgery:  No


Eye Surgery:  No


Genitourinary Surgery:  No


Gynecologic Surgery:  Yes (x2 c-sect ion  hysterectomy  x3 surgeries for 

endometriosis)


Hysterectomy:  Yes


Joint Replacement:  No


Neurologic Surgery:  No


Oral Surgery:  No


Pacemaker:  No


Thoracic Surgery:  No


Other Surgery:  Yes





Family History


Family Myocardial Infarction:  Yes





Social History


Alcohol Use:  No


Tobacco Use:  No


Substance Use:  No





Allergies-Medications


(Allergen,Severity, Reaction):  


Coded Allergies:  


     codeine (Unverified  Allergy, Severe, rash, 10/2/17)


     meperidine (Unverified  Allergy, Severe, rash, 10/2/17)


     sulfamethoxazole (Unverified  Allergy, Severe, HIVES, ITCHING, 10/2/17)


     trimethoprim (Unverified  Allergy, Severe, HIVES, ITCHING, 10/2/17)


Reported Meds & Prescriptions





Reported Meds & Active Scripts


Active


Aspirin EC (Aspirin) 81 Mg Tabdr 81 Mg PO DAILY 30 Days


Levaquin (Levofloxacin) 500 Mg Tablet 500 Mg PO DAILY@1100


Reported


Albuterol Neb (Albuterol Sulfate) 2.5 Mg/0.5 Ml Neb 2.5 Mg NEB Q4HR NEB PRN


     Note: The Albuterol Sulfate Inhalation Solution is concentrated and


     must be diluted. Read complete instructions carefully before using.


Synthroid (Levothyroxine Sodium) 137 Mcg Tab 137 Mcg PO DAILY


Protonix (Pantoprazole Sodium) 20 Mg Tab 20 Mg PO DAILY








Review of Systems


Except as stated in HPI:  all other systems reviewed are Neg





Physical Exam


Narrative


GENERAL: The patient is alert, oriented 3 in minimal apparent distress with 

her atrial fibrillation discomfort.  Her vital signs show blood pressure 153/85 

and heart rate of 122 which is atrial fibrillation with rapid ventricular 

response but the rest the vital signs are normal.


SKIN: Focused skin assessment warm/dry.


HEAD: Atraumatic. Normocephalic. 


EYES: Pupils equal and round. No scleral icterus. No injection or drainage. 


ENT: No nasal bleeding or discharge.  Mucous membranes pink and moist.


NECK: Trachea midline. No JVD. 


CARDIOVASCULAR: Atrial fibrillation with RVR.  No murmur appreciated.


RESPIRATORY: No accessory muscle use. Clear to auscultation. Breath sounds 

equal bilaterally. 


GASTROINTESTINAL: Abdomen soft, non-tender, nondistended. Hepatic and splenic 

margins not palpable. 


MUSCULOSKELETAL: No obvious deformities. No clubbing.  No cyanosis.  No edema. 


NEUROLOGICAL: Awake and alert. No obvious cranial nerve deficits.  Motor 

grossly within normal limits. Normal speech.


PSYCHIATRIC: Appropriate mood and affect; insight and judgment normal.





Data


Data


Last Documented VS





Vital Signs








  Date Time  Temp Pulse Resp B/P (MAP) Pulse Ox O2 Delivery O2 Flow Rate FiO2


 


18 21:51  78 16 142/67 (92) 99 Room Air  


 


18 20:23       2.00 


 


18 20:10 98.6       








Orders





 Orders


Electrocardiogram (18 )


Complete Blood Count With Diff (18 20:31)


Comprehensive Metabolic Panel (18 20:31)


B-Type Natriuretic Peptide (18 20:43)


Troponin I (18 20:31)


Electrocardiogram (18 20:50)





Labs





Laboratory Tests








Test


  18


20:33


 


White Blood Count 9.6 TH/MM3 


 


Red Blood Count 4.81 MIL/MM3 


 


Hemoglobin 10.8 GM/DL 


 


Hematocrit 34.2 % 


 


Mean Corpuscular Volume 71.2 FL 


 


Mean Corpuscular Hemoglobin 22.5 PG 


 


Mean Corpuscular Hemoglobin


Concent 31.5 % 


 


 


Red Cell Distribution Width 14.7 % 


 


Platelet Count 284 TH/MM3 


 


Mean Platelet Volume 9.0 FL 


 


Neutrophils (%) (Auto) 56.0 % 


 


Lymphocytes (%) (Auto) 34.7 % 


 


Monocytes (%) (Auto) 6.9 % 


 


Eosinophils (%) (Auto) 1.6 % 


 


Basophils (%) (Auto) 0.8 % 


 


Neutrophils # (Auto) 5.3 TH/MM3 


 


Lymphocytes # (Auto) 3.3 TH/MM3 


 


Monocytes # (Auto) 0.7 TH/MM3 


 


Eosinophils # (Auto) 0.2 TH/MM3 


 


Basophils # (Auto) 0.1 TH/MM3 


 


CBC Comment AUTO DIFF 


 


Differential Comment


  AUTO DIFF


CONFIRMED


 


Ovalocytes 1+ 


 


Blood Urea Nitrogen 20 MG/DL 


 


Creatinine 0.75 MG/DL 


 


Random Glucose 148 MG/DL 


 


Total Protein 7.3 GM/DL 


 


Albumin 3.6 GM/DL 


 


Calcium Level 8.6 MG/DL 


 


Alkaline Phosphatase 78 U/L 


 


Aspartate Amino Transf


(AST/SGOT) 18 U/L 


 


 


Alanine Aminotransferase


(ALT/SGPT) 28 U/L 


 


 


Total Bilirubin 0.2 MG/DL 


 


Sodium Level 140 MEQ/L 


 


Potassium Level 3.4 MEQ/L 


 


Chloride Level 108 MEQ/L 


 


Carbon Dioxide Level 23.8 MEQ/L 


 


Anion Gap 8 MEQ/L 


 


Estimat Glomerular Filtration


Rate 80 ML/MIN 


 


 


Troponin I


  LESS THAN 0.02


NG/ML


 


B-Type Natriuretic Peptide 98 PG/ML 











MDM


Medical Decision Making


Medical Screen Exam Complete:  Yes


Emergency Medical Condition:  Yes


Medical Record Reviewed:  Yes


Interpretation(s)


The CBC is normal except for hemoglobin of 10.8 and hematocrit of 34.2.  The 

complete metabolic profile shows a potassium of 3.4, BUN 20, GFR of 80, glucose 

of 148 but is otherwise unremarkable.  The troponin I is normal and the BNP is 

normal.


Differential Diagnosis


Atrial fibrillation, acute coronary syndrome, congestive heart failure, 

electrolyte disorder


Narrative Course


At approximately 8:40 PM the patient spontaneously converted to normal sinus 

rhythm.  After the normal sinus rhythm began the patient has no symptoms 

whatsoever.  The rate was 79.  The patient has a mild anemia.  I cannot find a 

cause why the patient has paroxysmal atrial fibrillation and what caused at 

this time.  Perhaps the caffeine in the medication she takes as a patch.  

Nevertheless, she should follow-up with her primary care physician.  It is now 

10:15, the patient has been in normal sinus rhythm since 8:40 PM and is wanting 

to go home.  She is totally asymptomatic.





Diagnosis





 Primary Impression:  


 Paroxysmal atrial fibrillation





***Additional Instructions:  


As we discussed, follow-up with your primary care physician next week.  It will 

be their decision to push on medications are not which on medications to prevent

, slow the rate atrial fibrillation.  In the meantime I would cut back on the 

caffeine containing patch because that may have stimulated your heart.


***Med/Other Pt SpecificInfo:  No Change to Meds


Disposition:  01 DISCHARGE HOME











Jac Sanders MD 2018 21:03

## 2018-02-23 NOTE — EKG
Date Performed: 02/23/2018       Time Performed: 20:33:58

 

PTAGE:      57 years

 

EKG:      ATRIAL FIBRILLATION WITH RAPID VENTRICULAR RESPONSE ABNORMAL RHYTHM ECG

 

PREVIOUS TRACING       : 10/02/2017 15.41 Compared to previous tracing, atrial fibrillation has repla
magno Sinus rhythm 

 

, heart rate has increased.

 

DOCTOR:   Joe Matthews  Interpretating Date/Time  02/23/2018 22:57:45

## 2018-03-09 ENCOUNTER — HOSPITAL ENCOUNTER (EMERGENCY)
Dept: HOSPITAL 17 - NEPE | Age: 58
Discharge: HOME | End: 2018-03-09
Payer: COMMERCIAL

## 2018-03-09 VITALS
DIASTOLIC BLOOD PRESSURE: 73 MMHG | RESPIRATION RATE: 16 BRPM | OXYGEN SATURATION: 99 % | HEART RATE: 61 BPM | SYSTOLIC BLOOD PRESSURE: 154 MMHG

## 2018-03-09 VITALS
DIASTOLIC BLOOD PRESSURE: 85 MMHG | SYSTOLIC BLOOD PRESSURE: 180 MMHG | HEART RATE: 67 BPM | RESPIRATION RATE: 18 BRPM | TEMPERATURE: 98 F | OXYGEN SATURATION: 99 %

## 2018-03-09 VITALS — SYSTOLIC BLOOD PRESSURE: 160 MMHG | DIASTOLIC BLOOD PRESSURE: 70 MMHG | RESPIRATION RATE: 16 BRPM

## 2018-03-09 DIAGNOSIS — R06.02: ICD-10-CM

## 2018-03-09 DIAGNOSIS — R00.2: Primary | ICD-10-CM

## 2018-03-09 DIAGNOSIS — Z79.01: ICD-10-CM

## 2018-03-09 DIAGNOSIS — E07.9: ICD-10-CM

## 2018-03-09 DIAGNOSIS — R42: ICD-10-CM

## 2018-03-09 LAB
BASOPHILS # BLD AUTO: 0 TH/MM3 (ref 0–0.2)
BASOPHILS NFR BLD: 0.2 % (ref 0–2)
BUN SERPL-MCNC: 21 MG/DL (ref 7–18)
CALCIUM SERPL-MCNC: 8.7 MG/DL (ref 8.5–10.1)
CHLORIDE SERPL-SCNC: 107 MEQ/L (ref 98–107)
CREAT SERPL-MCNC: 0.79 MG/DL (ref 0.5–1)
DIGOXIN SERPL-MCNC: 0.2 NG/ML (ref 0.8–2)
EOSINOPHIL # BLD: 0.1 TH/MM3 (ref 0–0.4)
EOSINOPHIL NFR BLD: 1.2 % (ref 0–4)
ERYTHROCYTE [DISTWIDTH] IN BLOOD BY AUTOMATED COUNT: 17.8 % (ref 11.6–17.2)
GFR SERPLBLD BASED ON 1.73 SQ M-ARVRAT: 75 ML/MIN (ref 89–?)
GLUCOSE SERPL-MCNC: 103 MG/DL (ref 74–106)
HCO3 BLD-SCNC: 27.3 MEQ/L (ref 21–32)
HCT VFR BLD CALC: 35.2 % (ref 35–46)
HGB BLD-MCNC: 11.2 GM/DL (ref 11.6–15.3)
LYMPHOCYTES # BLD AUTO: 2.8 TH/MM3 (ref 1–4.8)
LYMPHOCYTES NFR BLD AUTO: 30.8 % (ref 9–44)
MCH RBC QN AUTO: 22.6 PG (ref 27–34)
MCHC RBC AUTO-ENTMCNC: 31.7 % (ref 32–36)
MCV RBC AUTO: 71.5 FL (ref 80–100)
MONOCYTE #: 0.6 TH/MM3 (ref 0–0.9)
MONOCYTES NFR BLD: 6.6 % (ref 0–8)
NEUTROPHILS # BLD AUTO: 5.5 TH/MM3 (ref 1.8–7.7)
NEUTROPHILS NFR BLD AUTO: 61.2 % (ref 16–70)
PLATELET # BLD: 273 TH/MM3 (ref 150–450)
PMV BLD AUTO: 8.2 FL (ref 7–11)
RBC # BLD AUTO: 4.93 MIL/MM3 (ref 4–5.3)
SODIUM SERPL-SCNC: 142 MEQ/L (ref 136–145)
TROPONIN I SERPL-MCNC: (no result) NG/ML (ref 0.02–0.05)
WBC # BLD AUTO: 9 TH/MM3 (ref 4–11)

## 2018-03-09 PROCEDURE — 99284 EMERGENCY DEPT VISIT MOD MDM: CPT

## 2018-03-09 PROCEDURE — 84484 ASSAY OF TROPONIN QUANT: CPT

## 2018-03-09 PROCEDURE — 93005 ELECTROCARDIOGRAM TRACING: CPT

## 2018-03-09 PROCEDURE — 80162 ASSAY OF DIGOXIN TOTAL: CPT

## 2018-03-09 PROCEDURE — 85025 COMPLETE CBC W/AUTO DIFF WBC: CPT

## 2018-03-09 PROCEDURE — 80048 BASIC METABOLIC PNL TOTAL CA: CPT

## 2018-03-09 PROCEDURE — 82550 ASSAY OF CK (CPK): CPT

## 2018-03-09 NOTE — PD
HPI


Chief Complaint:  Cardiac Complaint


Time Seen by Provider:  20:05


Travel History


International Travel<30 days:  No


Contact w/Intl Traveler<30days:  No


Traveled to known affect area:  No





History of Present Illness


HPI


57-year-old  female presents to the emergency department for "fast 

heart rate".  She is diagnosed with new A. fib 2 weeks ago, started on rate 

control drugs by her cardiologist Dr. Colunga.  The drugs were causing her HR to 

go into the 40s and feel lethargic so the drugs were stopped. 3 days ago when 

the drugs were stopped she began to have episodes of her heart rate dropping 

into the 160s upon exertion.  She reports this only happens when she stands up 

and begins walking.  She also feels lightheaded and dizzy when this comes on 

and has to sit down to relieve herself. She has some shortness of breath when 

this begins. Her cardiologist put her digoxin which she took her first dose 

today and is instructed to take it every other day. He patient reports that she 

has been eating and drinking her normal amount throughout the day. She denies 

ever passing out. She called her cardiologist, Dr. Colunga, who was unable to 

return or call by the end of the day. She denies any chest pain, diaphoresis, 

nausea, vomiting, or fevers.





Modifying Factors: None


Associated Signs & Symptoms: Fast heart rate, palpitations


Risk Factors: Seen for the same with cardiology last week and started on digoxin

, took first dose today





PFSH


Past Medical History


Hx Anticoagulant Therapy:  Yes (XARELTO PRN)


Arthritis:  Yes


Asthma:  Yes


Atrial Fibrillation:  Yes


Autoimmune Disease:  No


Blood Disorders:  No


Anxiety:  No


Depression:  No


Heart Rhythm Problems:  Yes


Cancer:  Yes (cervical)


Cardiac Catheterization:  No


Cardiovascular Problems:  Yes (A-FIB)


High Cholesterol:  Yes


Chemotherapy:  No


Chest Pain:  Yes


Congestive Heart Failure:  No


COPD:  No


Cerebrovascular Accident:  No


Diminished Hearing:  No


Endocrine:  Yes


Gastrointestinal Disorders:  Yes


GERD:  Yes


Genitourinary:  No


Headaches:  No


Hepatitis:  No


Hiatal Hernia:  Yes


Heparin Induced Thrombocytopen:  No


Hypertension:  Yes


Immune Disorder:  No


Implanted Vascular Access Dvce:  No


Kidney Stones:  No


Musculoskeletal:  Yes (spurs lower back  bursitis knees)


Neurologic:  No


Psychiatric:  No


Reproductive:  Yes (endometriosis)


Respiratory:  Yes (sleep apnea  asthma)


Immunizations Current:  Yes


Migraines:  No


Pneumonia:  Yes


Radiation Therapy:  No


Renal Failure:  No


Seizures:  No


Sickle Cell Disease:  No


Sleep Apnea:  Yes


Thyroid Disease:  Yes


Ulcer:  No


Tetanus Vaccination:  > 5 Years


Influenza Vaccination:  Yes





Past Surgical History


Abdominal Surgery:  Yes (lap yogesh)


AICD:  No


Cardiac Surgery:  Yes (HEART CATH)


 Section:  Yes


Cholecystectomy:  Yes


Coronary Artery Bypass Graft:  No


Ear Surgery:  No


Endocrine Surgery:  No


Eye Surgery:  No


Genitourinary Surgery:  No


Gynecologic Surgery:  Yes (x2 c-sect ion  hysterectomy  x3 surgeries for 

endometriosis)


Hysterectomy:  Yes


Joint Replacement:  No


Neurologic Surgery:  No


Oral Surgery:  No


Pacemaker:  No


Thoracic Surgery:  No


Other Surgery:  Yes





Family History


Family Myocardial Infarction:  Yes





Social History


Alcohol Use:  No


Tobacco Use:  No


Substance Use:  No





Allergies-Medications


(Allergen,Severity, Reaction):  


Coded Allergies:  


     codeine (Unverified  Allergy, Severe, rash, 10/2/17)


     meperidine (Unverified  Allergy, Severe, rash, 10/2/17)


     sulfamethoxazole (Unverified  Allergy, Severe, HIVES, ITCHING, 10/2/17)


     trimethoprim (Unverified  Allergy, Severe, HIVES, ITCHING, 10/2/17)


Reported Meds & Prescriptions





Reported Meds & Active Scripts


Active


Claritin (Loratadine) 10 Mg Cap 10 Mg PO DAILY


Aspirin EC (Aspirin) 81 Mg Tabdr 81 Mg PO DAILY 30 Days


Levaquin (Levofloxacin) 500 Mg Tablet 500 Mg PO DAILY@1100


Reported


Digoxin 0.25 Mg Tab 0.25 Mg PO DAILY


Albuterol Neb (Albuterol Sulfate) 2.5 Mg/0.5 Ml Neb 2.5 Mg NEB Q4HR NEB PRN


     Note: The Albuterol Sulfate Inhalation Solution is concentrated and


     must be diluted. Read complete instructions carefully before using.


Synthroid (Levothyroxine Sodium) 137 Mcg Tab 137 Mcg PO DAILY


Protonix (Pantoprazole Sodium) 20 Mg Tab 20 Mg PO DAILY








Review of Systems


Except as stated in HPI:  all other systems reviewed are Neg


General / Constitutional:  No: Fever


Eyes:  No: Blurred Vision


HENT:  Positive: Vertigo, Lightheadedness


Cardiovascular:  Positive: Tachycardia, No: Chest Pain or Discomfort


Respiratory:  No: Cough


Gastrointestinal:  No: Nausea, Vomiting


Genitourinary:  No: Urgency


Musculoskeletal:  No: Myalgias


Neurologic:  Positive: Dizziness





Physical Exam


Narrative


GENERAL: A well-developed and well-nourished female in no acute distress.  

Ambulating in the ER without issues.  Awake, alert, oriented 3.


SKIN: Warm and dry.


HEAD: Normocephalic.


EYES: No scleral icterus. No injection or drainage. 


NECK: Supple, trachea midline. No JVD or lymphadenopathy.


CARDIOVASCULAR: Regular rate and rhythm without murmurs, gallops, or rubs.  

Pulses are present and equal bilaterally.


RESPIRATORY: Breath sounds equal bilaterally. No accessory muscle use.


GASTROINTESTINAL: Abdomen soft, non-tender, nondistended. 


MUSCULOSKELETAL: No cyanosis, or edema. 


BACK: Nontender without obvious deformity. No CVA tenderness.


NEUROLOGICAL: Awake and alert. Cranial nerves II through XII intact.  Motor and 

sensory grossly within normal limits. Five out of 5 muscle strength in all 

muscle groups.  Normal speech.


PSYCHIATRIC: No delusional thought processes. No hallucinations.





Data


Data


Last Documented VS





Vital Signs








  Date Time  Temp Pulse Resp B/P (MAP) Pulse Ox O2 Delivery O2 Flow Rate FiO2


 


3/9/18 20:08  61 16 154/73 (100) 99 Room Air  


 


3/9/18 15:46 98.0       








Orders





 Orders


Electrocardiogram (3/9/18 15:46)


Complete Blood Count With Diff (3/9/18 15:46)


Basic Metabolic Panel (Bmp) (3/9/18 15:46)


Ckmb (Isoenzyme) Profile (3/9/18 15:46)


Troponin I (3/9/18 15:46)


Digoxin (3/9/18 16:04)





Labs





Laboratory Tests








Test


  3/9/18


16:04


 


White Blood Count 9.0 TH/MM3 


 


Red Blood Count 4.93 MIL/MM3 


 


Hemoglobin 11.2 GM/DL 


 


Hematocrit 35.2 % 


 


Mean Corpuscular Volume 71.5 FL 


 


Mean Corpuscular Hemoglobin 22.6 PG 


 


Mean Corpuscular Hemoglobin


Concent 31.7 % 


 


 


Red Cell Distribution Width 17.8 % 


 


Platelet Count 273 TH/MM3 


 


Mean Platelet Volume 8.2 FL 


 


Neutrophils (%) (Auto) 61.2 % 


 


Lymphocytes (%) (Auto) 30.8 % 


 


Monocytes (%) (Auto) 6.6 % 


 


Eosinophils (%) (Auto) 1.2 % 


 


Basophils (%) (Auto) 0.2 % 


 


Neutrophils # (Auto) 5.5 TH/MM3 


 


Lymphocytes # (Auto) 2.8 TH/MM3 


 


Monocytes # (Auto) 0.6 TH/MM3 


 


Eosinophils # (Auto) 0.1 TH/MM3 


 


Basophils # (Auto) 0.0 TH/MM3 


 


CBC Comment DIFF FINAL 


 


Differential Comment  


 


Blood Urea Nitrogen 21 MG/DL 


 


Creatinine 0.79 MG/DL 


 


Random Glucose 103 MG/DL 


 


Calcium Level 8.7 MG/DL 


 


Sodium Level 142 MEQ/L 


 


Potassium Level 3.9 MEQ/L 


 


Chloride Level 107 MEQ/L 


 


Carbon Dioxide Level 27.3 MEQ/L 


 


Anion Gap 8 MEQ/L 


 


Estimat Glomerular Filtration


Rate 75 ML/MIN 


 


 


Total Creatine Kinase 61 U/L 


 


Troponin I


  LESS THAN 0.02


NG/ML


 


Digoxin Level 0.2 NG/ML 











MDM


Medical Decision Making


Medical Screen Exam Complete:  Yes


Emergency Medical Condition:  Yes


Medical Record Reviewed:  Yes


Interpretation(s)


EKG shows normal sinus rhythm at a rate of 63 bpm with no signs of acute ST 

elevations or depressions.








Laboratory Tests








Test


  3/9/18


16:04


 


Hemoglobin


  11.2 GM/DL


(11.6-15.3)


 


Mean Corpuscular Volume


  71.5 FL


(80.0-100.0)


 


Mean Corpuscular Hemoglobin


  22.6 PG


(27.0-34.0)


 


Mean Corpuscular Hemoglobin


Concent 31.7 %


(32.0-36.0)


 


Red Cell Distribution Width


  17.8 %


(11.6-17.2)


 


Blood Urea Nitrogen


  21 MG/DL


(7-18)


 


Estimat Glomerular Filtration


Rate 75 ML/MIN


(>89)


 


Troponin I


  LESS THAN 0.02


NG/ML


 


Digoxin Level


  0.2 NG/ML


(0.8-2.0)








Differential Diagnosis


Palpitations: Dysrhythmias versus dehydration versus metabolic issues


Narrative Course


Lab work did not show significant metabolic issues and EKG did not show 

dysrhythmias currently.  She is not in atrial fibrillation.  Orthostatic vital 

signs were done and did not show any signs of orthostasis.  She is ambulatory 

in the ER without issues.  I do note that her digoxin level is low and not 

therapeutic, this is likely because she has just started on the medication with 

first dose this morning.  However, patient will likely need her digoxin levels 

to be therapeutic since this is an ongoing problem.  I will have her take 1 

dose of digoxin tomorrow and then continue with every other day dosing as 

previously discussed with her cardiologist.  She needs to talk to her 

cardiologist regarding ongoing issues on Monday and digoxin dosing can be 

evaluated as well.  At this point, my plan would be to release her with follow-

up to primary care and cardiology.  Return for any worsening in symptoms as 

needed.  The plan was discussed with patient and she states understanding.





Diagnosis





 Primary Impression:  


 Palpitations


***Med/Other Pt SpecificInfo:  Existing Med Changed (Take 1 dose of digoxin 

tomorrow morning)


Disposition:  01 DISCHARGE HOME


Condition:  Stable











Sagrario Fernandez MD Mar 9, 2018 20:24

## 2018-03-11 NOTE — EKG
Date Performed: 03/09/2018       Time Performed: 15:58:32

 

PTAGE:      57 years

 

EKG:      Sinus rhythm 

 

 [id number] 

 

 PREVIOUS TRACING            : 02/23/2018 20.50 Since the prior tracing, there has been no significan
t change

 

DOCTOR:   Darwin Cristina  Interpretating Date/Time  03/11/2018 00:20:16